# Patient Record
Sex: FEMALE | Race: WHITE | Employment: STUDENT | URBAN - METROPOLITAN AREA
[De-identification: names, ages, dates, MRNs, and addresses within clinical notes are randomized per-mention and may not be internally consistent; named-entity substitution may affect disease eponyms.]

---
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
## 2019-05-10 ENCOUNTER — OFFICE VISIT (OUTPATIENT)
Dept: FAMILY MEDICINE CLINIC | Facility: CLINIC | Age: 19
End: 2019-05-10
Payer: COMMERCIAL

## 2019-05-10 VITALS
TEMPERATURE: 99 F | OXYGEN SATURATION: 98 % | RESPIRATION RATE: 16 BRPM | HEIGHT: 68 IN | HEART RATE: 75 BPM | BODY MASS INDEX: 24.55 KG/M2 | SYSTOLIC BLOOD PRESSURE: 114 MMHG | WEIGHT: 162 LBS | DIASTOLIC BLOOD PRESSURE: 76 MMHG

## 2019-05-10 DIAGNOSIS — J01.40 ACUTE NON-RECURRENT PANSINUSITIS: Primary | ICD-10-CM

## 2019-05-10 PROCEDURE — 99214 OFFICE O/P EST MOD 30 MIN: CPT | Performed by: FAMILY MEDICINE

## 2019-05-10 RX ORDER — AMOXICILLIN AND CLAVULANATE POTASSIUM 875; 125 MG/1; MG/1
1 TABLET, FILM COATED ORAL 2 TIMES DAILY
Qty: 14 TABLET | Refills: 0 | Status: SHIPPED | OUTPATIENT
Start: 2019-05-10 | End: 2019-05-17

## 2019-05-10 NOTE — PROGRESS NOTES
HPI:   Orly Ruth is a 20year old female who presents for upper respiratory symptoms for 10 days. Started with: runny and stuffy nose, scrathy throat, headcahes. Now has: same but also coughing productive. Morning and night worse.   No feve auscultation  CARDIO: RRR without murmur; well perfused    ASSESSMENT AND PLAN:   Arlene Carrillo is a 20year old female who presents with sinusitis.  PLAN:   abx indicated; push fluids, run humidifier; start flonase a few days before weekneds with un

## 2020-11-18 ENCOUNTER — TELEPHONE (OUTPATIENT)
Dept: FAMILY MEDICINE CLINIC | Facility: CLINIC | Age: 20
End: 2020-11-18

## 2020-11-18 NOTE — TELEPHONE ENCOUNTER
Spoke with the mother and advised that there is limited availability for appts.  But we were able to schedule for December  Future Appointments   Date Time Provider Miriam Bellamy   12/21/2020 11:15 AM Raz Peter MD [facility] Blanco

## 2020-11-18 NOTE — TELEPHONE ENCOUNTER
Mom called, Would like a physical for pt before first available of 1/8/21-would like before the end of the year. Please call mom at [phone]  Chayito Goldmann to speak with mom per last Verbal consent form.

## 2020-12-21 ENCOUNTER — OFFICE VISIT (OUTPATIENT)
Dept: FAMILY MEDICINE CLINIC | Facility: CLINIC | Age: 20
End: 2020-12-21
Payer: COMMERCIAL

## 2020-12-21 VITALS
HEIGHT: 68.25 IN | HEART RATE: 65 BPM | WEIGHT: 163 LBS | SYSTOLIC BLOOD PRESSURE: 118 MMHG | RESPIRATION RATE: 16 BRPM | BODY MASS INDEX: 24.71 KG/M2 | TEMPERATURE: 98 F | DIASTOLIC BLOOD PRESSURE: 70 MMHG | OXYGEN SATURATION: 99 %

## 2020-12-21 DIAGNOSIS — E55.9 VITAMIN D DEFICIENCY: ICD-10-CM

## 2020-12-21 DIAGNOSIS — Z13.220 LIPID SCREENING: ICD-10-CM

## 2020-12-21 DIAGNOSIS — Z00.00 ROUTINE HISTORY AND PHYSICAL EXAMINATION OF ADULT: Primary | ICD-10-CM

## 2020-12-21 DIAGNOSIS — R11.0 NAUSEA: ICD-10-CM

## 2020-12-21 DIAGNOSIS — R14.0 ABDOMINAL BLOATING: ICD-10-CM

## 2020-12-21 DIAGNOSIS — Z23 NEED FOR VACCINATION: ICD-10-CM

## 2020-12-21 DIAGNOSIS — E53.8 B12 DEFICIENCY: ICD-10-CM

## 2020-12-21 DIAGNOSIS — R19.5 CHANGE IN STOOL: ICD-10-CM

## 2020-12-21 PROCEDURE — 80050 GENERAL HEALTH PANEL: CPT | Performed by: FAMILY MEDICINE

## 2020-12-21 PROCEDURE — 90715 TDAP VACCINE 7 YRS/> IM: CPT | Performed by: FAMILY MEDICINE

## 2020-12-21 PROCEDURE — 82607 VITAMIN B-12: CPT | Performed by: FAMILY MEDICINE

## 2020-12-21 PROCEDURE — 3078F DIAST BP <80 MM HG: CPT | Performed by: FAMILY MEDICINE

## 2020-12-21 PROCEDURE — 84439 ASSAY OF FREE THYROXINE: CPT | Performed by: FAMILY MEDICINE

## 2020-12-21 PROCEDURE — 90471 IMMUNIZATION ADMIN: CPT | Performed by: FAMILY MEDICINE

## 2020-12-21 PROCEDURE — 3008F BODY MASS INDEX DOCD: CPT | Performed by: FAMILY MEDICINE

## 2020-12-21 PROCEDURE — 82784 ASSAY IGA/IGD/IGG/IGM EACH: CPT | Performed by: FAMILY MEDICINE

## 2020-12-21 PROCEDURE — 99395 PREV VISIT EST AGE 18-39: CPT | Performed by: FAMILY MEDICINE

## 2020-12-21 PROCEDURE — 90472 IMMUNIZATION ADMIN EACH ADD: CPT | Performed by: FAMILY MEDICINE

## 2020-12-21 PROCEDURE — 3074F SYST BP LT 130 MM HG: CPT | Performed by: FAMILY MEDICINE

## 2020-12-21 PROCEDURE — 36415 COLL VENOUS BLD VENIPUNCTURE: CPT | Performed by: FAMILY MEDICINE

## 2020-12-21 PROCEDURE — 82306 VITAMIN D 25 HYDROXY: CPT | Performed by: FAMILY MEDICINE

## 2020-12-21 PROCEDURE — 90632 HEPA VACCINE ADULT IM: CPT | Performed by: FAMILY MEDICINE

## 2020-12-21 PROCEDURE — 80061 LIPID PANEL: CPT | Performed by: FAMILY MEDICINE

## 2020-12-21 PROCEDURE — 84480 ASSAY TRIIODOTHYRONINE (T3): CPT | Performed by: FAMILY MEDICINE

## 2020-12-21 PROCEDURE — 83516 IMMUNOASSAY NONANTIBODY: CPT | Performed by: FAMILY MEDICINE

## 2020-12-21 PROCEDURE — 82728 ASSAY OF FERRITIN: CPT | Performed by: FAMILY MEDICINE

## 2020-12-21 RX ORDER — CLINDAMYCIN PHOSPHATE AND BENZOYL PEROXIDE 10; 50 MG/G; MG/G
GEL TOPICAL AS DIRECTED
COMMUNITY
Start: 2020-11-20

## 2020-12-21 RX ORDER — DOXYCYCLINE HYCLATE 100 MG/1
1 CAPSULE ORAL 2 TIMES DAILY
COMMUNITY
Start: 2020-11-19

## 2020-12-21 NOTE — PROGRESS NOTES
HPI:   Buster Henderson is a 23year old female who presents for a complete physical exam.      Patient complains of ongoing abdominal pain since summer 2019. Was in [place] Jan 2020 - Oct 2020 ([employer]). Her job was driving fuel trucks.   Fun experience, Z= 1.19)*    * Growth percentiles are based on CDC (Girls, 2-20 Years) data. Body mass index is 24.6 kg/m².      No results found for: CHOLEST, HDL, LDL, TRIGLY, AST, ALT         Current Outpatient Medications   Medication Sig Dispense Refill   • Clindamyc clear  EYES:PERRLA, EOMI, conjunctiva are clear  NECK: supple,no adenopathy,no thyromegaly, no JVD  LUNGS: clear to auscultation  CARDIO: RRR without murmur  GI: good BS's,no masses, HSM; + diffuse mild abdoinal tenderness, slightly worse in RUQ  : defer VACCINE    Abdominal bloating  -     VENIPUNCTURE  -     CBC WITH DIFFERENTIAL WITH PLATELET; Future  -     COMP METABOLIC PANEL (14); Future  -     FERRITIN; Future  -     ASSAY, THYROID STIM HORMONE; Future  -     TRIIODOTHYRONINE (T3) TOTAL;  Future  - deficiency  -     VENIPUNCTURE  -     CBC WITH DIFFERENTIAL WITH PLATELET; Future  -     COMP METABOLIC PANEL (14); Future  -     FERRITIN; Future  -     ASSAY, THYROID STIM HORMONE; Future  -     TRIIODOTHYRONINE (T3) TOTAL;  Future  -     FREE T4 (FREE TH

## 2021-01-08 ENCOUNTER — HOSPITAL ENCOUNTER (OUTPATIENT)
Dept: ULTRASOUND IMAGING | Age: 21
Discharge: HOME | End: 2021-01-08
Attending: FAMILY MEDICINE
Payer: COMMERCIAL

## 2021-01-08 DIAGNOSIS — R14.0 ABDOMINAL BLOATING: ICD-10-CM

## 2021-01-08 DIAGNOSIS — R11.0 NAUSEA: ICD-10-CM

## 2021-01-08 PROCEDURE — 76700 US EXAM ABDOM COMPLETE: CPT | Performed by: FAMILY MEDICINE

## 2021-02-18 ENCOUNTER — TELEPHONE (OUTPATIENT)
Dept: FAMILY MEDICINE CLINIC | Facility: CLINIC | Age: 21
End: 2021-02-18

## 2021-02-18 PROBLEM — R10.13 ABDOMINAL PAIN, EPIGASTRIC: Status: ACTIVE | Noted: 2021-02-18

## 2021-02-18 NOTE — TELEPHONE ENCOUNTER
Wali Garcia from [facility] office called Pt was refereed to them for kidney stones. They are needing records(any labs or xray's)  from when Pt was seen for kidney stone. Any questions please call [phone].  Fax number is [phone]

## 2024-03-05 ENCOUNTER — PATIENT OUTREACH (OUTPATIENT)
Dept: CASE MANAGEMENT | Age: 24
End: 2024-03-05

## 2024-03-05 NOTE — PROCEDURES
The office order for PCP removal request is Approved and finalized on March 5, 2024.    Thanks,  [facility] Team

## (undated) NOTE — MR AVS SNAPSHOT
[address] [address]  [phone]               Thank you for choosing us for your health care visit with Michelle Ho MD.  We are glad to serve you and happy to provide you with this sum If you have questions, you can call [phone] to talk to our [facility] Staff. Remember, [employer] is NOT to be used for urgent needs. For medical emergencies, dial 911.         Educational Information     Healthy Diet and Regular Exercise  [employer]